# Patient Record
Sex: MALE | Race: WHITE | NOT HISPANIC OR LATINO | URBAN - METROPOLITAN AREA
[De-identification: names, ages, dates, MRNs, and addresses within clinical notes are randomized per-mention and may not be internally consistent; named-entity substitution may affect disease eponyms.]

---
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
## 2022-07-14 NOTE — PROGRESS NOTES
Pediatric Critical Care Progress Note    Hospital Day: 4  Date: 1/22/2018     Time: 9:44 AM      SUBJECTIVE:     24 Hour Review  No acute issues overnight, waiting for bed at [facility].  Continues with HR 40-50s at night.    Review of Systems: I have reviewed the patent's history and at least 10 organ systems and found them to be unchanged other than noted above    OBJECTIVE:     Vital Signs Last 24 hours:    SpO2  Min: 97 %  Max: 98 %  O2 (LPM)  Min: 0  Max: 0  NIBP  Min: 116/57  Max: 121/73  Heart Rate (Monitored)  Min: 49  Max: 62  Temp  Min: 36.3 °C (97.4 °F)  Max: 37 °C (98.6 °F)        Intake/Output Summary (Last 24 hours) at 01/22/18 0944  Last data filed at 01/22/18 0930   Gross per 24 hour   Intake             1590 ml   Output              650 ml   Net              940 ml       Physical Exam  Gen:  Alert, comfortable, non-toxic  HEENT: NC/AT, PERRL, conjunctiva clear, nares clear, MMM, neck supple  Cardio: RRR, nl S1 S2, no murmur, pulses full and equal  Resp:  CTAB, no wheeze or rales  GI:  Soft, ND/NT, normal bowel sounds, no guarding/rebound  Skin: no rash  Extremities: Cap refill <3sec, WWP, MOSQUERA well  Neuro: Non-focal, grossly intact, no deficits    O2 Delivery: None (Room Air) O2 (LPM): 0       Lines/ Tubes / Drains:   none    Labs and Imaging:  No results found for this or any previous visit (from the past 24 hour(s)).        CURRENT MEDICATIONS:  No current facility-administered medications for this encounter.           ASSESSMENT:     Jef  is a 17  y.o. 9  m.o. Male admitted on 1/19/2018 with ingestion: Clonipine/THC for recreation. Now medically cleared, has baseline sinus bradycardia at rest and asleep but no further syncope         Patient Active Problem List    Diagnosis Date Noted   • Bradycardia 01/19/2018         PLAN:     RESP: Continue to monitor saturation and for any respiratory distress.  Provide oxygen as needed to maintain saturations >90%. RA, no distress.    CV: Monitor  Appointment booked + patient notified.   hemodynamics.  Normal orthostatics.  No dysrhythmia noted.  Sinus rhythm.    GI: Diet: regular, encourage PO.    FEN/Endo/Renal: Follow electrolytes, correct as needed. Fluids: heplock    ID: Monitor for fever, evidence of infection.  Abx: None     HEME: Evaluate CBC and coags as indicated.     NEURO: Follow mental status, provide comfort as indicated.      DISPO: Patient care and plans reviewed and directed with PICU team on rounds today.  No family at bedside.     Patient is now medically cleared for transfer to [facility], however, currently no available staff or beds, following closely with social work    Time Spent : 25 minutes including bedside evaluation, discussion with healthcare team and family discussions.    The above note was signed by : Harper Rudolph , PICU Attending